# Patient Record
Sex: FEMALE | Race: WHITE
[De-identification: names, ages, dates, MRNs, and addresses within clinical notes are randomized per-mention and may not be internally consistent; named-entity substitution may affect disease eponyms.]

---

## 2018-08-23 ENCOUNTER — HOSPITAL ENCOUNTER (OUTPATIENT)
Dept: HOSPITAL 61 - PCVCIMAG | Age: 68
Discharge: HOME | End: 2018-08-23
Attending: INTERNAL MEDICINE
Payer: COMMERCIAL

## 2018-08-23 DIAGNOSIS — R06.09: ICD-10-CM

## 2018-08-23 DIAGNOSIS — I25.10: ICD-10-CM

## 2018-08-23 DIAGNOSIS — I10: ICD-10-CM

## 2018-08-23 DIAGNOSIS — R94.31: ICD-10-CM

## 2018-08-23 DIAGNOSIS — I06.1: Primary | ICD-10-CM

## 2018-08-23 PROCEDURE — 93306 TTE W/DOPPLER COMPLETE: CPT

## 2018-08-23 NOTE — PCVCIMAG
--------------- APPROVED REPORT --------------





Study performed:  2018 10:35:11



EXAM: Comprehensive 2D, Doppler, and color-flow 

Echocardiogram

Patient Location: Echo lab

Status:  routine



BSA:         2.05

HR: 87 bpmBP:          129/83 mmHg

Rhythm: NSR



Other Information 

Study Quality: Adequate



Indications

Abnormal ECG 

Dyspnea 

CAD

Chest Pain



2D Dimensions

LVEF(%):  36.65 (&gt;50%)

IVSd:  11.05 (7-11mm)LVOT Diam:  21.56 (18-24mm) 

LVDd:  38.00 mm

PWd:  10.58 (7-11mm)Ascending Ao:  33.56 (22-36mm)

LVDs:  31.44 (25-40mm)

Left Atrium:  32.84 (27-40mm)

Aortic Root:  29.36 mm

LV Single Plane 4CH:  51.02 %

LV Single Plane 2CH:  46.17 %Jeff[place]'s LVEF:  48.60 %

Biplane EF:  49.4 %



Volumes

Left Atrial Volume (Systole)

Single Plane 4CH:  49.62 mLSingle Plane 2CH:  67.10 mL

LA ESV Index:  28.00 mL/m2



Aortic Valve

AoV Peak Carlo.:  1.96 m/s

AO Peak Gr.:  15.44 mmHgLVOT Max P.36 mmHg

LVOT Max V:  1.26 m/s

STERLING Vmax: 2.34 cm2

AI Vmax:  4.66 m/s

AI [place]:  3.06 m/s2

AI PHT:  444.61 ms



Mitral Valve

E/A Ratio:  0.6

MV Decel. Time:  271.03 ms

MV E Max Carlo.:  0.62 m/s

MV A Carlo.:  0.97 m/s

IVRT:  103.81 ms



Pulmonary Valve

PV Peak Carlo.:  0.84 m/sPV Peak Gr.:  2.85 mmHg



Pulmonary Vein

P Vein S:    0.28 m/sP Vein A:  0.33 m/s

P Vein D:   0.34 m/sP Vein A Dur.:  114.2 msec

P Vein S/D Ratio:  0.82



Tricuspid Valve

TR Peak Carlo.:  2.20 m/s

TR Peak Gr.:  19.32 mmHg



Left Ventricle

The left ventricle is normal size. There is normal LV segmental wall 

motion. There is normal left ventricular wall thickness. Left 

ventricular systolic function is within lower limits of normal. LVEF 

is 50-55%. Grade I - abnormal relaxation pattern.



Right Ventricle

The right ventricle is normal size. The right ventricular systolic 

function is normal.



Atria

The left atrium size is normal. The right atrium size is 

normal.



Aortic Valve

The aortic valve is normal in structure. Mild aortic regurgitation. 

There is no aortic valvular stenosis.



Mitral Valve

The mitral valve is normal in structure. Trace mitral regurgitation. 

No evidence of mitral valve stenosis.



Tricuspid Valve

The tricuspid valve is normal in structure. Trace tricuspid 

regurgitation with PAP of 26 mmHg.



Pulmonic Valve

The pulmonary valve is normal in structure. There is no pulmonic 

valvular regurgitation.



Great Vessels

The aortic root is normal in size. IVC is normal in size and 

collapses with &gt;50% inspiration



Pericardium

There is no pericardial effusion. There is no pleural 

effusion.



&lt;Conclusion&gt;

The left ventricle is normal size.

LVEF is 50-55%.

There is normal LV segmental wall motion.

The aortic valve is normal in structure.

Mild aortic regurgitation.

The mitral valve is normal in structure.

Trace mitral regurgitation.

The tricuspid valve is normal in structure.

Trace tricuspid regurgitation with PAP of 26 mmHg.

The pulmonary valve is normal in structure.

There is no pericardial effusion.

## 2018-09-20 ENCOUNTER — HOSPITAL ENCOUNTER (OUTPATIENT)
Dept: HOSPITAL 61 - PCVCCLINIC | Age: 68
Discharge: HOME | End: 2018-09-20
Attending: INTERNAL MEDICINE
Payer: COMMERCIAL

## 2018-09-20 DIAGNOSIS — I25.10: Primary | ICD-10-CM

## 2018-09-20 DIAGNOSIS — Z79.82: ICD-10-CM

## 2018-09-20 DIAGNOSIS — I10: ICD-10-CM

## 2018-09-20 DIAGNOSIS — F17.210: ICD-10-CM

## 2018-09-20 PROCEDURE — G0463 HOSPITAL OUTPT CLINIC VISIT: HCPCS

## 2019-03-21 ENCOUNTER — HOSPITAL ENCOUNTER (OUTPATIENT)
Dept: HOSPITAL 61 - PCVCIMAG | Age: 69
Discharge: HOME | End: 2019-03-21
Attending: INTERNAL MEDICINE
Payer: COMMERCIAL

## 2019-03-21 DIAGNOSIS — I65.23: Primary | ICD-10-CM

## 2019-03-21 DIAGNOSIS — Z95.5: ICD-10-CM

## 2019-03-21 DIAGNOSIS — I10: ICD-10-CM

## 2019-03-21 DIAGNOSIS — R09.89: ICD-10-CM

## 2019-03-21 PROCEDURE — 93880 EXTRACRANIAL BILAT STUDY: CPT

## 2019-09-19 ENCOUNTER — HOSPITAL ENCOUNTER (OUTPATIENT)
Dept: HOSPITAL 61 - PCVCCLINIC | Age: 69
Discharge: HOME | End: 2019-09-19
Attending: INTERNAL MEDICINE
Payer: COMMERCIAL

## 2019-09-19 DIAGNOSIS — F17.210: ICD-10-CM

## 2019-09-19 DIAGNOSIS — Z88.5: ICD-10-CM

## 2019-09-19 DIAGNOSIS — Z85.41: ICD-10-CM

## 2019-09-19 DIAGNOSIS — E78.00: ICD-10-CM

## 2019-09-19 DIAGNOSIS — I10: ICD-10-CM

## 2019-09-19 DIAGNOSIS — Z88.1: ICD-10-CM

## 2019-09-19 DIAGNOSIS — Z79.82: ICD-10-CM

## 2019-09-19 DIAGNOSIS — E78.5: ICD-10-CM

## 2019-09-19 DIAGNOSIS — Z90.710: ICD-10-CM

## 2019-09-19 DIAGNOSIS — Z79.899: ICD-10-CM

## 2019-09-19 DIAGNOSIS — Z88.0: ICD-10-CM

## 2019-09-19 DIAGNOSIS — I25.10: Primary | ICD-10-CM

## 2019-09-19 PROCEDURE — G0463 HOSPITAL OUTPT CLINIC VISIT: HCPCS

## 2019-09-19 PROCEDURE — 93005 ELECTROCARDIOGRAM TRACING: CPT

## 2020-08-14 ENCOUNTER — HOSPITAL ENCOUNTER (OUTPATIENT)
Dept: HOSPITAL 35 - SJCVC | Age: 70
End: 2020-08-14
Attending: INTERNAL MEDICINE
Payer: COMMERCIAL

## 2020-08-14 DIAGNOSIS — I25.10: ICD-10-CM

## 2020-08-14 DIAGNOSIS — Z79.899: ICD-10-CM

## 2020-08-14 DIAGNOSIS — R94.31: Primary | ICD-10-CM

## 2020-08-14 DIAGNOSIS — I10: ICD-10-CM

## 2020-08-14 DIAGNOSIS — E78.5: ICD-10-CM

## 2020-08-14 DIAGNOSIS — F17.210: ICD-10-CM

## 2020-09-01 ENCOUNTER — HOSPITAL ENCOUNTER (INPATIENT)
Dept: HOSPITAL 35 - ER | Age: 70
LOS: 1 days | Discharge: HOME | DRG: 812 | End: 2020-09-02
Attending: FAMILY MEDICINE | Admitting: FAMILY MEDICINE
Payer: MEDICARE

## 2020-09-01 VITALS — SYSTOLIC BLOOD PRESSURE: 124 MMHG | DIASTOLIC BLOOD PRESSURE: 53 MMHG

## 2020-09-01 VITALS — DIASTOLIC BLOOD PRESSURE: 61 MMHG | SYSTOLIC BLOOD PRESSURE: 133 MMHG

## 2020-09-01 VITALS — DIASTOLIC BLOOD PRESSURE: 62 MMHG | SYSTOLIC BLOOD PRESSURE: 118 MMHG

## 2020-09-01 VITALS — DIASTOLIC BLOOD PRESSURE: 54 MMHG | SYSTOLIC BLOOD PRESSURE: 137 MMHG

## 2020-09-01 VITALS — SYSTOLIC BLOOD PRESSURE: 133 MMHG | DIASTOLIC BLOOD PRESSURE: 59 MMHG

## 2020-09-01 VITALS — DIASTOLIC BLOOD PRESSURE: 53 MMHG | SYSTOLIC BLOOD PRESSURE: 126 MMHG

## 2020-09-01 VITALS — SYSTOLIC BLOOD PRESSURE: 137 MMHG | DIASTOLIC BLOOD PRESSURE: 54 MMHG

## 2020-09-01 VITALS — SYSTOLIC BLOOD PRESSURE: 118 MMHG | DIASTOLIC BLOOD PRESSURE: 62 MMHG

## 2020-09-01 VITALS — WEIGHT: 187 LBS | BODY MASS INDEX: 30.05 KG/M2 | HEIGHT: 65.98 IN

## 2020-09-01 VITALS — SYSTOLIC BLOOD PRESSURE: 133 MMHG | DIASTOLIC BLOOD PRESSURE: 55 MMHG

## 2020-09-01 DIAGNOSIS — I25.10: ICD-10-CM

## 2020-09-01 DIAGNOSIS — Z88.0: ICD-10-CM

## 2020-09-01 DIAGNOSIS — E87.6: ICD-10-CM

## 2020-09-01 DIAGNOSIS — Z88.6: ICD-10-CM

## 2020-09-01 DIAGNOSIS — E66.9: ICD-10-CM

## 2020-09-01 DIAGNOSIS — I10: ICD-10-CM

## 2020-09-01 DIAGNOSIS — Z90.710: ICD-10-CM

## 2020-09-01 DIAGNOSIS — D64.9: Primary | ICD-10-CM

## 2020-09-01 DIAGNOSIS — F17.210: ICD-10-CM

## 2020-09-01 DIAGNOSIS — D61.818: ICD-10-CM

## 2020-09-01 DIAGNOSIS — D69.6: ICD-10-CM

## 2020-09-01 DIAGNOSIS — E78.00: ICD-10-CM

## 2020-09-01 DIAGNOSIS — J44.9: ICD-10-CM

## 2020-09-01 DIAGNOSIS — Z95.5: ICD-10-CM

## 2020-09-01 DIAGNOSIS — E78.5: ICD-10-CM

## 2020-09-01 LAB
%HYPO/RBC NFR BLD AUTO: (no result) %
ALBUMIN SERPL-MCNC: 2.9 G/DL (ref 3.4–5)
ALT SERPL-CCNC: 12 U/L (ref 30–65)
ANION GAP SERPL CALC-SCNC: 9 MMOL/L (ref 7–16)
ANISOCYTOSIS BLD QL SMEAR: (no result)
APTT BLD: 21.8 SECONDS (ref 24.5–32.8)
AST SERPL-CCNC: 14 U/L (ref 15–37)
BASOPHILS NFR BLD AUTO: 0 % (ref 0–2)
BILIRUB SERPL-MCNC: 0.3 MG/DL (ref 0.2–1)
BUN SERPL-MCNC: 10 MG/DL (ref 7–18)
CALCIUM SERPL-MCNC: 8.7 MG/DL (ref 8.5–10.1)
CHLORIDE SERPL-SCNC: 107 MMOL/L (ref 98–107)
CO2 SERPL-SCNC: 25 MMOL/L (ref 21–32)
CREAT SERPL-MCNC: 0.8 MG/DL (ref 0.6–1)
EOSINOPHIL NFR BLD: 0 % (ref 0–3)
ERYTHROCYTE [DISTWIDTH] IN BLOOD BY AUTOMATED COUNT: 21.5 % (ref 10.5–14.5)
FOLATE SERPL-MCNC: 20.6 NG/ML (ref 8.6–58.9)
GLUCOSE SERPL-MCNC: 98 MG/DL (ref 74–106)
GRANULOCYTES NFR BLD MANUAL: 48 % (ref 36–66)
HCT VFR BLD CALC: 14.4 % (ref 37–47)
HGB BLD-MCNC: 4.3 G/DL (ref 11.1–15.9)
HGB BLD-MCNC: 4.4 GM/DL (ref 12–15)
INR PPP: 1.1
IRON SERPL-MCNC: 131 UG/DL (ref 50–170)
LG PLATELETS BLD QL SMEAR: (no result)
LYMPHOCYTES NFR BLD AUTO: 48 % (ref 24–44)
MCH RBC QN AUTO: 24.7 PG (ref 26–34)
MCHC RBC AUTO-ENTMCNC: 30.2 G/DL (ref 28–37)
MCV RBC: 81.9 FL (ref 80–100)
MONOCYTES NFR BLD: 3 % (ref 1–8)
NEUTROPHILS # BLD: 1.4 THOU/UL (ref 1.4–8.2)
NEUTS BAND NFR BLD: 1 % (ref 0–8)
OBSERVED RETIC COUNT: 3.63 % (ref 0.6–2.6)
PLATELET # BLD: 149 THOU/UL (ref 150–400)
POIKILOCYTOSIS BLD QL SMEAR: SLIGHT
POTASSIUM SERPL-SCNC: 3.3 MMOL/L (ref 3.5–5.1)
PROT SERPL-MCNC: 6.4 G/DL (ref 6.4–8.2)
PROTHROMBIN TIME: 11.2 SECONDS (ref 9.3–11.4)
RBC # BLD AUTO: 1.76 MIL/UL (ref 4.2–5)
SAO2 % BLD FROM PO2: 33 % (ref 20–39)
SODIUM SERPL-SCNC: 141 MMOL/L (ref 136–145)
TIBC SERPL-MCNC: 401 UG/DL (ref 250–450)
TSH SERPL-ACNC: 4.71 UIU/ML (ref 0.36–3.74)
VIT B12 SERPL-MCNC: 143 PG/ML (ref 193–986)
WBC # BLD AUTO: 2.8 THOU/UL (ref 4–11)

## 2020-09-01 PROCEDURE — 30233N1 TRANSFUSION OF NONAUTOLOGOUS RED BLOOD CELLS INTO PERIPHERAL VEIN, PERCUTANEOUS APPROACH: ICD-10-PCS | Performed by: FAMILY MEDICINE

## 2020-09-01 PROCEDURE — 10195: CPT

## 2020-09-01 NOTE — NUR
PT ARRIVED TO FLOOR AROUND 1200 FROM ED. PT IS ALERT AND ORIENTED. NO
COMPLAINTS AT THIS TIME. VSSA/RA. PIV IN PLACE. 2 UNITS OF BLOOD ORDERED.
FIRST UNIT STARTED. VSS. TOLERATING HER DIET. EDUCATED PT TO CALL IF SHE WERE
TO NEED ANYTHING. CALL LIGHT IN REACH. WILL CONTINUE TO MONITOR

## 2020-09-01 NOTE — NUR
ASSESSMENT: CM REVIEWED CHART AND SPOKE WITH PATIENT. PT IS ALERT AND ORIENTED
X4. PT IS HERE DUE TO ABNORMAL LABS. PTS HEMOGLOBIN WAS 4.9 AND SHE IS
RECEIVING BLOOD TRANSFUSION. PT REPORTS SHE LIVES IN A HOUSE AND CURRENTLY HER
SON AND GRANDCHILDREN STAY WITH HER A FEW TIMES A WEEK. PT REPORTS  BEING
FULLY INDEPENDENT WITH ADLS AND AMBULATION. PT REPORTS THAT SHE HAS NO DME AND
HAS NOT HAD HH IN THE PAST. PTS PCP IS DR. PRITCHETT. PT REPORTS HAVING NO
NEEDS FROM CM. CM WILL CONTINUE TO FOLLOW TO ASSIST AS NEEDED.

## 2020-09-02 VITALS — DIASTOLIC BLOOD PRESSURE: 59 MMHG | SYSTOLIC BLOOD PRESSURE: 135 MMHG

## 2020-09-02 VITALS — SYSTOLIC BLOOD PRESSURE: 145 MMHG | DIASTOLIC BLOOD PRESSURE: 60 MMHG

## 2020-09-02 VITALS — DIASTOLIC BLOOD PRESSURE: 75 MMHG | SYSTOLIC BLOOD PRESSURE: 158 MMHG

## 2020-09-02 LAB
HCT VFR BLD CALC: 19.3 % (ref 37–47)
HCT VFR BLD CALC: 20.3 % (ref 37–47)
HCT VFR BLD CALC: 24.8 % (ref 37–47)
HGB BLD-MCNC: 6.4 GM/DL (ref 12–15)
HGB BLD-MCNC: 6.5 GM/DL (ref 12–15)
HGB BLD-MCNC: 8.1 GM/DL (ref 12–15)

## 2020-09-02 PROCEDURE — 07DR3ZX EXTRACTION OF ILIAC BONE MARROW, PERCUTANEOUS APPROACH, DIAGNOSTIC: ICD-10-PCS | Performed by: RADIOLOGY

## 2020-09-02 NOTE — NUR
RECEIVED REPORT THAT PT RECEIVING SECOND UNIT OF BLOOD. UPON ASSESSMENT, BLOOD
COMPLETED AROUND 1845. RECEIVED CRITICAL LAB VALUES OF RBC AT 1.81, HBG AT
4.3, AND HCT AT 15.3. ATTENDING DOCTOR NOTIFIED, RECEIVED ORDERS TO REDRAW
H&H AND TO NOTIFY DR. HALE WITH RESULTS. UPON REDRAWING H&H, RECEIVED
CRITICAL LAB VALUES OF HBG AT 6.4 AND HCT AT 19.3. NOTIFIED DR. HALE VIA
ANSWERING SERVICE, SPOKE WITH DR. OSGOOD, NO NEW ORDERS RECEIVED, ADVISED TO
HOLD OFF ON TRANSFUSING PT, DR. HALE TO EVALUATE PT TODAY. PT ASSESSED
TO BE AOX4. PT DENIES PAIN AND SOB. PT AMBULATING INDEPENDENTLY IN ROOM AND TO
BATHROOM. PT DENIES DIZZINESS OR WEAKNESS. PT TOLERATING PO INTAKE OF FLUIDS
AND REGULAR DIET. PT WITHOUT BM. PT ENCOURAGED TO NOTIFY STAFF FOR ALL NEEDS,
CALL LIGHT WITHIN REACH, BED IN LOWEST POSITION, WILL CONTINUE TO MONITOR.

## 2020-09-02 NOTE — NUR
ON-GOING ASSESSMENT: cm reviewed chart. PTS HEMOGLOBIN IS 6.4 AND PT IS TO GET
A TRANSFUSION. PT MAY ALSO BE GETTING A BONE MARROW BIOPSY. CM WILL CONTINUE
TO FOLLOW TO ASSIST AS NEEDED.

## 2020-09-02 NOTE — NUR
DC ORDERS RECIEVED. IV REMOVED FROM L FA. DC INSTRUCTIONS, F/U APPOINTMENT
REVIEWED WITH PT. 1 UNIT PRBC X1 INFUSED PRIOR TO PT HAVING BONE MARROW DRAWN
FOR LAB IN IR. HGB NOW 8.1, HCT 24.8. PT ESCORTED TO MAIN ENTRANCE.

## 2020-09-03 NOTE — HC
[facility]
[address] [place]
[place]   [address]                     CONSULTATION                  
_______________________________________________________________________________
 
Name:       JOJO BRIGHT               Room #:         435-P       [facility] [place]  
M.R.#:      [id number]                       Account #:      [id number]  
Admission:  09/01/20    Attend Phys:    Cale Elder MD  
Discharge:  09/02/20    Date of Birth:  [birth year]  
                                                          Report #: [id number]
                                                                    [id number]   
_______________________________________________________________________________
THIS REPORT FOR:  
 
cc:  Cale Elder MD, Neal A. MD McKittrick, Richard James MD                                  ~
CC: Angel Elder MD
 
REASON FOR CONSULTATION:  Anemia, leukopenia and thrombocytopenia.
 
HISTORY OF PRESENT ILLNESS:  The patient is a very pleasant 69-year-old female
who had been feeling gradually poorly over the last about 4-5 months with some
fatigue and also some leg discomfort.  She has seen her cardiologist recently
and they found her hemoglobin was about 4.4.  She was sent to the [facility]. 
Here, her hemoglobin was 4.4.  White count 2.8 with a fairly normal
differential, platelets 149.  Absolute neutrophil count 1400, MCV of 81.9 with
an RDW at 21.5, .  Absolute retic count 0.0637.  Iron was 131.  Ferritin
30, TIBC 401, percent saturation 33, total bilirubin 0.3.  Creatinine 0.8, BUN
at 10.
 
REVIEW OF SYSTEMS:  The patient denies fevers, chills, nausea, vomiting, weight
change, blood in her urine or stool, dark urine, tea or honey colored urine,
black tarry stool.  She has never had a colonoscopy.  EGD showed an ulcer years
ago.  She said, has not had a mammogram.
 
PAST MEDICAL HISTORY:  She has a history of a hysterectomy for carcinoma in
situ.  Also, hypercholesterolemia, hypertension, coronary artery disease, I
believe with stents.
 
SOCIAL HISTORY:  Retired, used to work for the phone company.  Also, one time
worked for the [employer].
 
FAMILY HISTORY:  Father had throat cancer.  Mother had heart disease.  Sister,
no specific issues.  One son alive and well.  Still smokes daily.  Rare to no
alcohol, no street drugs.
 
PHYSICAL EXAMINATION:
GENERAL:  The patient appears her stated age.
VITAL SIGNS:  Height is 5 feet 6 inches or 167.6 cm, weight 187 pounds or 84.8
kilograms.  Blood pressure 135/59, O2 sat 99%, respirations 18, pulse 72,
afebrile, temperature 98.
MOOD:  She is alert and pleasant and talkative.
NEUROLOGIC:  Speech and thought pattern normal.  Moving all extremities
appropriately.
LYMPHATICS:  No enlarged lymph nodes in the supraclavicular, cervical, axillary
 
 
 
[address]   [address]                     CONSULTATION                  
_______________________________________________________________________________
 
Name:       JOJO BRIGHT NIRAV               Room #:         435-P       [facility] [place]  
M.R.#:      [id number]                       Account #:      [id number]  
Admission:  09/01/20    Attend Phys:    Cale Elder MD  
Discharge:  09/02/20    Date of Birth:  [birth year]  
                                                          Report #: [id number]
                                                                    [id number]   
_______________________________________________________________________________
or inguinal region.
ABDOMEN:  Slightly obese.  No masses, nontender.  No hepatosplenomegaly.
EXTREMITIES:  Without clubbing, cyanosis, may be trace edema.
 
ASSESSMENT AND PLAN:
1.  Mild pancytopenia with mostly anemia that is not macrocytic, as you might
expect if this was B12 deficiency, unclear.  Also, wonder if this could be some
form of aplasia that is developing or perhaps related to Effient, would suggest
bone marrow biopsy.  I have placed orders.  We will also order to transfuse to
get hemoglobin above 7.
2.  Possible B12 deficiency, replace.  Methylmalonic acid level is pending.
3.  Coronary artery disease.  We will see if bone marrow will need Effient held.
4.  Hypertension.  Metoprolol.
5.  Statins.  Continue rosuvastatin.
6.  Tobaccoism.  Encourage cessation.
 
Did talk with Dr. Elder.  The patient has financial issues.  A bone marrow
can be done today.  We would like to do it today.  If it can be done until
tomorrow as an outpatient, the patient could potentially be discharged.  We will
also ask for bone marrow that they do flows, cytogenetics and specifically for
[facility].  The patient is aware of plan.
 
 
 
 
 
 
 
 
 
 
 
 
 
 
 
 
 
 
 
 
 
 
 
  <ELECTRONICALLY SIGNED>
   By: Joaquin Littlejohn MD  
  09/03/20     0756
D: 09/02/20 0829                           _____________________________________
T: 09/02/20 1013                           Joaquin Littlejohn MD    /nt

## 2021-10-15 ENCOUNTER — HOSPITAL ENCOUNTER (OUTPATIENT)
Dept: HOSPITAL 35 - SJCVC | Age: 71
End: 2021-10-15
Attending: INTERNAL MEDICINE
Payer: COMMERCIAL

## 2021-10-15 DIAGNOSIS — Z88.5: ICD-10-CM

## 2021-10-15 DIAGNOSIS — I25.10: Primary | ICD-10-CM

## 2021-10-15 DIAGNOSIS — Z79.82: ICD-10-CM

## 2021-10-15 DIAGNOSIS — Z88.0: ICD-10-CM

## 2021-10-15 DIAGNOSIS — Z82.49: ICD-10-CM

## 2021-10-15 DIAGNOSIS — Z88.8: ICD-10-CM

## 2021-10-15 DIAGNOSIS — F17.200: ICD-10-CM

## 2021-10-15 DIAGNOSIS — R09.89: ICD-10-CM

## 2021-10-15 DIAGNOSIS — E78.5: ICD-10-CM

## 2021-10-15 DIAGNOSIS — R06.00: ICD-10-CM

## 2021-10-15 DIAGNOSIS — R07.9: ICD-10-CM

## 2021-10-15 DIAGNOSIS — R25.2: ICD-10-CM

## 2021-10-15 DIAGNOSIS — Z95.5: ICD-10-CM

## 2021-10-15 DIAGNOSIS — I10: ICD-10-CM

## 2021-10-15 DIAGNOSIS — Z79.899: ICD-10-CM

## 2024-10-19 NOTE — PCVCIMAG
--------------- APPROVED REPORT --------------





Laterality: Bilateral



Indications

Bruit



Doppler Spectral Velocity Analysis

PSV  /  EDVPSV  /  EDV

ECA (R)     111 / 21 cm/sECA (L)     130 / 25 cm/s

dICA (R)    47 / 18 cm/sdICA (L)     55 / 23 cm/s

Leonidas (R)     59 / 22 cm/smICA (L)     64 / 23 cm/s

pICA (R)     44 / 15 cm/spICA (L)     59 / 19 cm/s

Bulb (R)        70 / 19 cm/sBulb (L)         65 / 21 cm/s

dCCA (R)     78 / 21 cm/sdCCA (L)     68 / 30 cm/s

mCCA (R)    57 / 17 cm/smCCA (L)    80 / 24 cm/s

Vert (R)     50 / 13 cm/sVert (L)     42 / 12 cm/s

ICA/CCA     ICA/CCA     



Findings

The right carotid bulb has moderate calcified plaque.

The right proximal internal carotid artery shows <40% 

stenosis.

The right common carotid artery shows no significant stenosis.

The right external carotid artery shows no significant stenosis.

The left carotid bulb has mild  plaque.

The left proximal internal carotid artery shows no significant 

stenosis.

The left common carotid artery shows no significant stenosis.

The left external carotid artery shows no significant 

stenosis.



Conclusion

1. Right internal carotid artery plaquing (<40% stenosis)

2.  Left internal carotid artery plaquing without significant 

stenosis

3.  Antegrade vertebral flow Additional EKG Note...